# Patient Record
Sex: FEMALE | Race: BLACK OR AFRICAN AMERICAN | Employment: FULL TIME | URBAN - METROPOLITAN AREA
[De-identification: names, ages, dates, MRNs, and addresses within clinical notes are randomized per-mention and may not be internally consistent; named-entity substitution may affect disease eponyms.]

---

## 2022-06-28 ENCOUNTER — HOSPITAL ENCOUNTER (EMERGENCY)
Age: 27
Discharge: HOME | End: 2022-06-28
Attending: EMERGENCY MEDICINE
Payer: COMMERCIAL

## 2022-06-28 VITALS
SYSTOLIC BLOOD PRESSURE: 115 MMHG | BODY MASS INDEX: 23.04 KG/M2 | DIASTOLIC BLOOD PRESSURE: 81 MMHG | RESPIRATION RATE: 16 BRPM | WEIGHT: 130 LBS | HEIGHT: 63 IN | HEART RATE: 85 BPM | OXYGEN SATURATION: 100 % | TEMPERATURE: 97.1 F

## 2022-06-28 DIAGNOSIS — L50.9 URTICARIA: ICD-10-CM

## 2022-06-28 DIAGNOSIS — T78.40XA ALLERGIC REACTION, INITIAL ENCOUNTER: Primary | ICD-10-CM

## 2022-06-28 PROCEDURE — 99283 EMERGENCY DEPT VISIT LOW MDM: CPT

## 2022-06-28 PROCEDURE — 74011636637 HC RX REV CODE- 636/637: Performed by: EMERGENCY MEDICINE

## 2022-06-28 RX ORDER — PREDNISONE 50 MG/1
50 TABLET ORAL DAILY
Qty: 1 TABLET | Refills: 0 | Status: SHIPPED | OUTPATIENT
Start: 2022-06-28 | End: 2022-06-29

## 2022-06-28 RX ADMIN — PREDNISONE 50 MG: 20 TABLET ORAL at 09:57

## 2022-06-28 NOTE — ED NOTES
Pt discharged home, verbalized understanding of discharge instructions and rx given, work note given, pt ambulated out of department with steady gait.

## 2022-06-28 NOTE — Clinical Note
[address] EMERGENCY DEPT  [address] [facility]  [place] [address]  [phone]    Work/School Note    Date: 6/28/2022    To Whom It May concern:      Kev Cagle was seen and treated today in the emergency room by the following provider(s):  Attending Provider: Greer Banegas MD.      Kev Cagle is excused from work/school on 06/28/22. She is clear to return to work/school on 06/29/22.         Sincerely,          Aurora Kiran MD

## 2022-06-28 NOTE — DISCHARGE INSTRUCTIONS
Return to the emergency department if your symptoms are worsening, if you are having difficulty breathing, have abdominal pain or vomiting, or develop any other symptoms you find concerning. I recommend you use 25 mg of Benadryl as needed for any itching. Please read the box when you get this from the pharmacy to determine the total amount of Benadryl you can take per day. It also filled the prescription for prednisone I have written for you tomorrow if you are still having residual hives. Follow-up with your primary care doctor.

## 2022-06-28 NOTE — ED TRIAGE NOTES
Pt reports she ate salmon last night around 2100 and felt like she began having difficulty catching her breath thought she had indigestion around 2200.  Pt woke this am with rash on back and chest. +itching put cortisone cream on the rash and states it helped the itching; pt took Childrens benadryl this am; pt also reports diarrhea, facial swelling; pt maintaining airway, able to clear secretions, ambulated to room with out difficulty

## 2022-06-28 NOTE — ED PROVIDER NOTES
HPI   Healthy 59-year-old female presents to the emergency department due to concerns for an allergic reaction. Yesterday evening the patient ate some salmon. She says when she had eaten salmon once before, she developed an upset stomach that she thought was because the salmon was in the refrigerator for few days. However last night about 2 hours after eating the salmon she developed some heartburn and the sensation that her throat was a little bit tight. When she woke up this morning she noted that she was itchy and she had hives on her back as well as her arms. Then when she looked in the mirror her eyes were a little bit swollen. Her significant other put some hydrocortisone cream on her back and the patient took 10 mL of children's Benadryl. This has improved the itchiness. She says her eye swelling has improved but is still present. She denies difficulty swallowing or difficulty breathing. She denies lip or tongue swelling. No abdominal pain, nausea, or vomiting. She has started no new medications and is not changed soaps/detergents. She denies any other complaints. History reviewed. No pertinent past medical history. History reviewed. No pertinent surgical history. History reviewed. No pertinent family history.     Social History     Socioeconomic History    Marital status: Not on file     Spouse name: Not on file    Number of children: Not on file    Years of education: Not on file    Highest education level: Not on file   Occupational History    Not on file   Tobacco Use    Smoking status: Never Smoker    Smokeless tobacco: Never Used   Substance and Sexual Activity    Alcohol use: Yes     Comment: social    Drug use: Never    Sexual activity: Not on file   Other Topics Concern    Not on file   Social History Narrative    Not on file     Social Determinants of Health     Financial Resource Strain:     Difficulty of Paying Living Expenses: Not on file   Food Insecurity:  Worried About Running Out of Food in the Last Year: Not on file    Huy of Food in the Last Year: Not on file   Transportation Needs:     Lack of Transportation (Medical): Not on file    Lack of Transportation (Non-Medical): Not on file   Physical Activity:     Days of Exercise per Week: Not on file    Minutes of Exercise per Session: Not on file   Stress:     Feeling of Stress : Not on file   Social Connections:     Frequency of Communication with Friends and Family: Not on file    Frequency of Social Gatherings with Friends and Family: Not on file    Attends [employer] Services: Not on file    Active Member of [address] [place] [address] or Organizations: Not on file    Attends Club or Organization Meetings: Not on file    Marital Status: Not on file   Intimate Partner Violence:     Fear of Current or Ex-Partner: Not on file    Emotionally Abused: Not on file    Physically Abused: Not on file    Sexually Abused: Not on file   Housing Stability:     Unable to Pay for Housing in the Last Year: Not on file    Number of [place] in the Last Year: Not on file    Unstable Housing in the Last Year: Not on file         ALLERGIES: [employer]    Review of Systems   All systems reviewed are negative unless otherwise documented in the HPI. Vitals:    06/28/22 0909   Weight: 59 kg (130 lb)   Height: 5' 3\" (1.6 m)            Physical Exam  Constitutional:       Comments: Resting comfortably no acute distress   HENT:      Mouth/Throat:      Mouth: Mucous membranes are moist.      Pharynx: No oropharyngeal exudate or posterior oropharyngeal erythema. Comments: No lip or tongue swelling. No uvular edema. Eyes:      General: No scleral icterus. Extraocular Movements: Extraocular movements intact. Comments: Slight periorbital edema bilaterally.    Neck:      Comments: No stridor  Cardiovascular:      Comments: Regular rate and rhythm  Pulmonary:      Effort: Pulmonary effort is normal. No respiratory distress. Breath sounds: Normal breath sounds. No wheezing or rales. Abdominal:      General: There is no distension. Palpations: Abdomen is soft. Tenderness: There is no abdominal tenderness. Musculoskeletal:         General: No deformity. Normal range of motion. Cervical back: Normal range of motion. Skin:     General: Skin is warm and dry. Comments: Scattered urticaria on the back as well as the bilateral upper extremities with some excoriations. Neurological:      Comments: Awake and alert. GCS 15          MDM   41-year-old female presents with the above chief complaint. Vitals are stable. She has some slight periorbital edema. No lip or tongue swelling. Oropharynx is clear without signs of swelling or uvular edema. Lungs are clear without wheezing. She has urticaria on her back as well as on her arms with some excoriations. She does not clinically have anaphylaxis. I am going to give her some prednisone. Patient will be given a dose of prednisone via a prescription to fill if her rash does not resolve by tomorrow. She was instructed to take 25 mg of Benadryl as needed for itching which she will  from the pharmacy. She does not need epinephrine. I discussed with the patient that she should refrain from eating salmon. Return precautions provided and discussed and patient discharged in stable condition.     Procedures

## 2022-06-28 NOTE — Clinical Note
[address] EMERGENCY DEPT  [address] [facility]  [phone]  [phone]    Work/School Note    Date: 6/28/2022    To Whom It May concern:      Margy Ackerman was seen and treated today in the emergency room by the following provider(s):  Attending Provider: Mariia Mejia MD.      Margy Ackerman is excused from work/school on 06/28/22. She is clear to return to work/school on 06/29/22.         Sincerely,          Mike Ricks MD

## 2025-08-20 ENCOUNTER — OFFICE VISIT (OUTPATIENT)
Age: 30
End: 2025-08-20

## 2025-08-20 VITALS
HEIGHT: 63 IN | BODY MASS INDEX: 24.8 KG/M2 | RESPIRATION RATE: 16 BRPM | WEIGHT: 140 LBS | OXYGEN SATURATION: 98 % | DIASTOLIC BLOOD PRESSURE: 72 MMHG | SYSTOLIC BLOOD PRESSURE: 109 MMHG | TEMPERATURE: 98 F | HEART RATE: 68 BPM

## 2025-08-20 DIAGNOSIS — N89.8 VAGINAL DISCHARGE: ICD-10-CM
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
## 2025-08-20 LAB
HCG, PREGNANCY, URINE, POC: NEGATIVE
VALID INTERNAL CONTROL, POC: NORMAL

## 2025-08-20 RX ORDER — FLUCONAZOLE 150 MG/1
150 TABLET ORAL ONCE
Qty: 1 TABLET | Refills: 0 | Status: SHIPPED | OUTPATIENT
Start: 2025-08-20 | End: 2025-08-20

## 2025-08-20 ASSESSMENT — ENCOUNTER SYMPTOMS
EYES NEGATIVE: 1
ALLERGIC/IMMUNOLOGIC NEGATIVE: 1
RESPIRATORY NEGATIVE: 1
GASTROINTESTINAL NEGATIVE: 1

## 2025-08-23 LAB
A VAGINAE DNA VAG QL NAA+PROBE: NORMAL SCORE
BVAB2 DNA VAG QL NAA+PROBE: NORMAL SCORE
C ALBICANS DNA VAG QL NAA+PROBE: NEGATIVE
C GLABRATA DNA VAG QL NAA+PROBE: NEGATIVE
CANDIDA KRUSEI: NEGATIVE
CANDIDA LUSITANIAE, NAA: NEGATIVE
CANDIDA PARAPSILOSIS/TROPICALIS: NEGATIVE
MEGA1 DNA VAG QL NAA+PROBE: NORMAL SCORE

## 2025-08-27 LAB
A VAGINAE DNA VAG QL NAA+PROBE: NORMAL SCORE
BVAB2 DNA VAG QL NAA+PROBE: NORMAL SCORE
C ALBICANS DNA VAG QL NAA+PROBE: NEGATIVE
C GLABRATA DNA VAG QL NAA+PROBE: NEGATIVE
C TRACH RRNA SPEC QL NAA+PROBE: NEGATIVE
CANDIDA KRUSEI: NEGATIVE
CANDIDA LUSITANIAE, NAA: NEGATIVE
CANDIDA PARAPSILOSIS/TROPICALIS: NEGATIVE
MEGA1 DNA VAG QL NAA+PROBE: NORMAL SCORE
N GONORRHOEA RRNA SPEC QL NAA+PROBE: NEGATIVE
T VAGINALIS RRNA SPEC QL NAA+PROBE: NEGATIVE